# Patient Record
Sex: FEMALE | Race: WHITE
[De-identification: names, ages, dates, MRNs, and addresses within clinical notes are randomized per-mention and may not be internally consistent; named-entity substitution may affect disease eponyms.]

---

## 2017-03-11 NOTE — OR
DATE:  03/10/2017

 

PREOPERATIVE DIAGNOSIS:  Right hallux osteomyelitis.

 

POSTOPERATIVE DIAGNOSIS:  Right hallux osteomyelitis

 

PROCEDURE PERFORMED:  Right foot 1st distal phalanx amputation.

 

ANESTHESIA:  Local MAC with preoperative local block of 10 mL of 1:1 mixture of

1% lidocaine plain and 0.5% Marcaine plain.

 

TOURNIQUET TIME:  10 minutes pneumatic ankle tourniquet.

 

ESTIMATED BLOOD LOSS:  Minimal.

 

SPECIMEN REMOVED:  Right foot distal hallux.

 

COMPLICATIONS:  None.

 

INDICATIONS:  Teagan is an 82-year-old female, who presents with a right great

toe that had a prior wound on it.  I had been seen her for the wound for a few

months.  We did take an x-ray, which showed an erosive change in the distal tuft

of the right big toe.  She did not have any open wounds at that time, it had

completely healed up and she has been on antibiotics recently. We did discuss

the amputation of that great toe versus IV antibiotics and she just wanted to be

amputated at this point.  It is causing her some throbbing pain.  She denies any

nausea, vomiting, fever, or chills.

 

X-rays 3 views of that right foot revealed erosive changes of the distal phalanx

of the right great toe indicative of osteomyelitis.  No other signs of

osteomyelitis throughout the foot or ankle.

 

The patient voiced good understanding of the proposed procedure and possible

complications, and elects to have surgery at this time.

 

DESCRIPTION OF THE PROCEDURE:  The patient was taken the operating room, lying

in the supine position.  After adequate anesthesia induction as described above,

the right foot was prepped and draped in the usual sterile fashion.  A pneumatic

ankle tourniquet was inflated to 216 mmHg.

 

Attention was then directed to the distal hallux of the right foot where a

fishmouth incision was made at the proximal interphalangeal joint.  Sharp

dissection was performed down to the level directly to the level of the

interphalangeal joint.  The entire distal phalanx was removed and sent to

pathology.  The head of the proximal phalanx was inspected and it was noted to

be healthy in appearance, there was 1 small articular defect, but no signs of

infection.  I did take a rongeur and take a piece of this, head of the proximal

phalanx and sent that to lab as well for our proximal margin to verify, we did

remove all the osteomyelitis.  I then debrided any tissue that did not appear

healthy.  I let down the tourniquet at this time and made sure all remaining

tissue has good bleeding healthy tissue.  I sharply transected the flexor and

extensor tendon as far proximally as possible and let them retracted.  The

tendon at the proximal aspect did appear healthy in nature without any signs of

infection.  I then irrigated the area with copious amounts of normal saline

mixed with a gentamicin solution.  I then took cultures of the area and sent

those to lab as post-irrigation cultures.  The tissue was bleeding well and

viable, and I did close the area with 3-0 nylon.  The area was then dressed with

Xeroform to the incision site, fluffs, Webril, and an Ace wrap.  She was placed

in a postoperative shoe.  She tolerated the anesthesia and the procedure well

and was transported to recovery room with vital signs stable and vascular status

intact as noted by hyperemia to all digits upon deflation of the tourniquet.

The patient was then discharged home when she met hospital discharge

requirements.

 

DD:  03/11/2017 18:02:46

DT:  03/11/2017 23:24:00

[facility]

Job #:  [id number]

## 2017-12-14 NOTE — PCM.OPNOTE
- General Post-Op/Procedure Note


Date of Surgery/Procedure: 12/14/17


Operative Procedure(s): right foot bone excision with wash out, great toe


Pre Op Diagnosis: right great toe infected diabetic ulcer


Post-Op Diagnosis: rodrigue with osteomyelitis


Anesthesia Technique: Local, MAC


Primary Surgeon: Elsa Barragan


Anesthesia Provider: Eleazar Puente


Pathology: 





right great toe bone, cultures


EBL in mLs: 5


Complications: none


Condition: Good


Free Text/Narrative:: 





Pt tolerated procedure well and was transported to recovery with vascular 

status intact to right foot. TT 22 mins, let down before dressing with good 

bleeding. Well padded dressing and post op shoe applied.

## 2020-08-19 ENCOUNTER — HOSPITAL ENCOUNTER (OUTPATIENT)
Dept: HOSPITAL 43 - DL.SDS | Age: 85
Discharge: HOME | End: 2020-08-19
Attending: OPHTHALMOLOGY
Payer: MEDICARE

## 2020-08-19 VITALS — HEART RATE: 77 BPM | SYSTOLIC BLOOD PRESSURE: 147 MMHG | DIASTOLIC BLOOD PRESSURE: 47 MMHG

## 2020-08-19 DIAGNOSIS — Z88.8: ICD-10-CM

## 2020-08-19 DIAGNOSIS — Z79.899: ICD-10-CM

## 2020-08-19 DIAGNOSIS — I12.9: ICD-10-CM

## 2020-08-19 DIAGNOSIS — H25.812: ICD-10-CM

## 2020-08-19 DIAGNOSIS — N18.2: ICD-10-CM

## 2020-08-19 DIAGNOSIS — Z79.82: ICD-10-CM

## 2020-08-19 DIAGNOSIS — E03.9: ICD-10-CM

## 2020-08-19 DIAGNOSIS — E11.36: Primary | ICD-10-CM

## 2020-08-19 DIAGNOSIS — E11.22: ICD-10-CM

## 2020-08-19 DIAGNOSIS — Z77.22: ICD-10-CM

## 2020-08-19 PROCEDURE — V2632 POST CHMBR INTRAOCULAR LENS: HCPCS

## 2020-08-19 PROCEDURE — 00142 ANES PX ON EYE LENS SURGERY: CPT

## 2020-08-19 PROCEDURE — 66984 XCAPSL CTRC RMVL W/O ECP: CPT

## 2020-08-19 NOTE — OR
DATE:  08/19/2020

 

PREOPERATIVE DIAGNOSIS:  Visually significant mixed cataract, left eye.

 

POSTOPERATIVE DIAGNOSIS:  Visually significant mixed cataract, left eye.

 

PROCEDURE:  Extracapsular cataract extraction with intraocular lens implant,

left eye.

 

ANESTHESIA:  Topical/local MAC.

 

COMPLICATIONS:  None.

 

INDICATION:  Ms.. Whittaker was seen in the clinic.  She is unhappy with her

vision, noticing a progressive change.  Her examination revealed visually

significant mixed cataract.  I explained options, offered cataract surgery, and

I explained risks, including, but not limited to, infection, retinal detachment,

loss of vision, need for additional surgery, and risks associated with

anesthesia, amongst others.  We discussed implant options.  She has requested a

monofocal implant.  She is comfortable wearing glasses following surgery if

necessary.

 

OPERATIVE DESCRIPTION:  After informed consent was obtained and the risks,

benefits, and alternatives were explained, the patient was brought to the

operative suite and topical anesthesia was administered.  The patient was then

prepped and draped in the sterile fashion and attention was placed on the left

eye.  A sterile lid speculum was placed into the left eye to allow operative

exposure. A full-thickness paracentesis was made in the temporal portion of the

operative eye. Preservative-free lidocaine 0.1 mL was injected into the anterior

chamber followed by viscoelastic. A full-thickness corneal incision was then

made into the anterior chamber.  A bent needle cystotome was used to create a

small nick in the anterior capsule. The capsulorrhexis forceps was then used to

create a 360-degree curvilinear capsulorrhexis.  The nucleus was then removed

using a phacoemulsification handpiece and the remaining cortical material was

then removed with irrigation and aspiration handpiece. Following removal of the

cortical material, the capsular bag was then inspected and noted to be free of

any holes or tears. Viscoelastic was then injected into the capsular bag and the

intraocular lens was inserted into the capsular bag. The viscoelastic material

was then removed from both the anterior and posterior chambers and from behind

the IOL. The lens and capsular bag were then reinspected. The IOL was well

centered and the capsular bag intact. The wound and paracentesis sites were

inspected and hydrated with balanced saline solution. Both were found to be self-

sealing. The intraocular pressure was assessed digitally and found to be within

normal range. A good red reflex was noted at the completion of the procedure. No

complications occurred during the operation. At the completion of the procedure,

Maxitrol, Voltaren, and Iopidine drops were placed into the operative eye. A

sterile eye shield was placed over the operative eye and the patient was

transported to the postoperative recovery area having tolerated the procedure

well. Postoperative instructions were given along with a postoperative

appointment.  The patient was advised to call with any questions or concerns.

 

DD:  08/19/2020 09:26:50

DT:  08/19/2020 11:27:09

[facility]

Job #:  [id number]

## 2020-08-26 ENCOUNTER — HOSPITAL ENCOUNTER (OUTPATIENT)
Dept: HOSPITAL 43 - DL.SDS | Age: 85
Discharge: HOME | End: 2020-08-26
Attending: OPHTHALMOLOGY
Payer: MEDICARE

## 2020-08-26 VITALS — DIASTOLIC BLOOD PRESSURE: 54 MMHG | SYSTOLIC BLOOD PRESSURE: 153 MMHG | HEART RATE: 65 BPM

## 2020-08-26 DIAGNOSIS — Z79.82: ICD-10-CM

## 2020-08-26 DIAGNOSIS — E11.22: ICD-10-CM

## 2020-08-26 DIAGNOSIS — Z79.84: ICD-10-CM

## 2020-08-26 DIAGNOSIS — H25.811: ICD-10-CM

## 2020-08-26 DIAGNOSIS — E78.5: ICD-10-CM

## 2020-08-26 DIAGNOSIS — E11.36: Primary | ICD-10-CM

## 2020-08-26 DIAGNOSIS — Z88.8: ICD-10-CM

## 2020-08-26 DIAGNOSIS — E03.9: ICD-10-CM

## 2020-08-26 DIAGNOSIS — Z79.890: ICD-10-CM

## 2020-08-26 DIAGNOSIS — E66.9: ICD-10-CM

## 2020-08-26 DIAGNOSIS — N18.2: ICD-10-CM

## 2020-08-26 DIAGNOSIS — Z79.899: ICD-10-CM

## 2020-08-26 DIAGNOSIS — I12.9: ICD-10-CM

## 2020-08-26 PROCEDURE — V2632 POST CHMBR INTRAOCULAR LENS: HCPCS

## 2020-08-27 NOTE — OR
DATE:  08/26/2020

 

PREOPERATIVE DIAGNOSIS:  Visually significant mixed cataract, right eye.

 

POSTOPERATIVE DIAGNOSIS:  Visually significant mixed cataract, right eye.

 

PROCEDURE:  Extracapsular cataract extraction with intraocular lens implant,

right eye.

 

ANESTHESIA:  Topical/local MAC.

 

COMPLICATIONS:  None.

 

INDICATION:  Ms. Whittaker was seen in the clinic.  She is unhappy with her vision

noticing a progressive change and decrease.  Examination revealed visually

significant mixed cataract.  I explained options.  I offered cataract surgery,

and I explained risks including, but not limited to, infection, retinal

detachment, loss of vision, need for additional surgery, and risks associated

with anesthesia amongst others.  We discussed implant options.  She requested a

monofocal implant.

 

OPERATIVE DESCRIPTION:  After informed consent was obtained and the risks,

benefits, and alternatives were explained, the patient was brought to the

operative suite and topical anesthesia was administered.  The patient was then

prepped and draped in the sterile fashion and attention was placed on the right

eye.  A sterile lid speculum was placed into the right eye to allow operative

exposure. A full-thickness paracentesis was made in the temporal portion of the

operative eye. Preservative-free lidocaine 0.1 mL was injected into the anterior

chamber followed by viscoelastic. A full-thickness corneal incision was then

made into the anterior chamber.  A bent needle cystotome was used to create a

small nick in the anterior capsule. The capsulorrhexis forceps was then used to

create a 360-degree curvilinear capsulorrhexis.  The nucleus was then removed

using a phacoemulsification handpiece and the remaining cortical material was

then removed with irrigation and aspiration handpiece. Following removal of the

cortical material, the capsular bag was then inspected and noted to be free of

any holes or tears. Viscoelastic was then injected into the capsular bag and the

intraocular lens was inserted into the capsular bag. The viscoelastic material

was then removed from both the anterior and posterior chambers and from behind

the IOL. The lens and capsular bag were then reinspected. The IOL was well

centered and the capsular bag intact. The wound and paracentesis sites were

inspected and hydrated with balanced saline solution. Both were found to be self-

sealing. The intraocular pressure was assessed digitally and found to be within

normal range. A good red reflex was noted at the completion of the procedure. No

complications occurred during the operation. At the completion of the procedure,

Maxitrol, Voltaren, and Iopidine drops were placed into the operative eye. A

sterile eye shield was placed over the operative eye, and the patient was

transported to the postoperative recovery area having tolerated the procedure

well. Postoperative instructions were given along with a postoperative

appointment.  The patient was advised to call with any questions or concerns.

 

DD:  08/26/2020 08:11:39

DT:  08/26/2020 15:06:42

[facility]

Job #:  [id number]

## 2023-12-07 NOTE — OR
DATE:  12/14/2017

 

PREOPERATIVE DIAGNOSIS:  Right great toe diabetic ulceration with infection.

 

POSTOPERATIVE DIAGNOSIS:  Right great toe diabetic ulceration with infection.

 

PROCEDURE PERFORMED:  Right great toe ulcer excision with bone excision and

washout.

 

ANESTHESIA:  Local MAC with preoperative local block of 10 mL 1:1 mixture of 1%

lidocaine plain and 0.5% Marcaine plain.

 

TOURNIQUET TIME:  22 minutes.  Pneumatic ankle tourniquet.

 

ESTIMATED BLOOD LOSS:  Minimal.

 

SPECIMEN REMOVED:  Bone of proximal phalanx, great toe.

 

COMPLICATIONS:  None.

 

INDICATIONS:  Teagan is an 82-year-old female, who presents with a chronic

ulceration to the plantar aspect of the right great toe.  I have been seeing her

every few weeks for wound care, however, she states that there have been

increased drainage and redness to the area in the last few days.  It did cause

her foot and ankle to swell.  She was recently put on an antibiotic, but it did

not seem to help much.  She has history of distal phalanx amputation on that toe

from a chronic wound, she then was healed for quite a few months and started

wearing a new flat sneaker and developed a blister, which then developed into a

wound.  This was a few months ago and it has been present since that time.

 

X-rays of the right foot revealed distal phalanx amputation of the first digit,

there is a prominent plantar condyle on the proximal phalanx, but no signs of

cortical erosion from x-rays back in October.

 

The patient voiced good understanding of proposed procedure and possible

complications, and elects to have surgery at this time.

 

DESCRIPTION OF PROCEDURE:  The patient was taken to the operating room, lying in

supine position.  After adequate anesthesia induction as described above, the

right foot was prepped and draped in the usual sterile fashion.  Before it was

prepped, I did take cultures, both aerobic and anaerobic and sent them to the

lab.  A pneumatic ankle tourniquet was inflated to 225 mmHg.  Attention was then

directed to the plantar aspect of the right great toe, where there is an

ulceration present that does probe to bone at this time.  I made an elliptical

incision at the area, and completely excise the ulceration and the surrounding

infected tissue.  I was able to completely visualize the bone at that time and

it was noted to have erosion at the plantar aspect.  I removed that portion of

the bone with a bone cutter and also removed it more proximal, down to good

healthy appearing bone.  I sent this bone to the lab.  I then debrided any

infected necrotic-looking tissue from the area.  I irrigated with copious

amounts of normal saline with gentamicin solution mixture.  All of the remaining

tissue appeared healthy in appearance including the bone and tendon, I did

excise as far down as possible.  I then let the tourniquet down and again

visualized the tissue and had good bleeding to all tissue without any infected

or necrotic-appearing tissue remaining.  I then did close the incision site with

3-0 nylon.  The toe did have good coloring after sutures were placed.  I applied

Xeroform to the incision site, fluffs, Webril, and Ace wrap.  She was placed

into a postoperative shoe and she does have a walker, so she will make sure she

tries to avoid any pressure to that area until the sutures are removed in a

couple weeks.  She tolerated anesthesia and the procedure well and was

transferred to recovery with vascular status intact to all digits upon deflation

of the ankle tourniquet.  She was then discharged home when she met hospital

discharge requirements.

 

DD:  12/15/2017 16:55:55

DT:  12/15/2017 22:30:45

[facility]

Job #:  [id number]
I independently performed the documented:
I attest my time as attending is greater than 50% of the total combined time spent on qualifying patient care activities by the PA/NP and attending.